# Patient Record
Sex: MALE | Race: WHITE | NOT HISPANIC OR LATINO | Employment: FULL TIME | URBAN - NONMETROPOLITAN AREA
[De-identification: names, ages, dates, MRNs, and addresses within clinical notes are randomized per-mention and may not be internally consistent; named-entity substitution may affect disease eponyms.]

---
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
## 2025-06-11 NOTE — ED PROVIDER NOTES
Subjective     History provided by:  Patient  Laceration  Location:  Hand  Hand laceration location:  L palm  Length:  3cm  Depth:  Through dermis  Bleeding: controlled    Time since incident:  2 hours  Laceration mechanism:  Knife  Pain details:     Quality:  Aching    Severity:  Mild  Foreign body present:  No foreign bodies  Tetanus status:  Up to date  Associated symptoms: no fever        Review of Systems   Constitutional: Negative.  Negative for fever.   HENT: Negative.    Respiratory: Negative.    Cardiovascular: Negative.  Negative for chest pain.   Gastrointestinal: Negative.  Negative for abdominal pain.   Endocrine: Negative.    Genitourinary: Negative.  Negative for dysuria.   Skin: Positive for wound.   Neurological: Negative.    Psychiatric/Behavioral: Negative.    All other systems reviewed and are negative.      No past medical history on file.    No Known Allergies    Past Surgical History:   Procedure Laterality Date   • ADENOIDECTOMY     • APPENDECTOMY     • TONSILLECTOMY         No family history on file.    Social History     Socioeconomic History   • Marital status: Single   Tobacco Use   • Smoking status: Never Smoker           Objective   Physical Exam  Vitals and nursing note reviewed.   Constitutional:       General: He is not in acute distress.     Appearance: He is well-developed. He is not diaphoretic.   HENT:      Head: Normocephalic and atraumatic.      Right Ear: External ear normal.      Left Ear: External ear normal.      Nose: Nose normal.   Eyes:      Conjunctiva/sclera: Conjunctivae normal.   Neck:      Vascular: No JVD.      Trachea: No tracheal deviation.   Cardiovascular:      Rate and Rhythm: Normal rate.      Heart sounds: No murmur heard.      Pulmonary:      Effort: Pulmonary effort is normal. No respiratory distress.      Breath sounds: No wheezing.   Abdominal:      Palpations: Abdomen is soft.      Tenderness: There is no abdominal tenderness.   Musculoskeletal:          Subjective   Patient ID: Chris Mariano is a 60 y.o. male who presents for Annual Exam.    Chris comes in today for comprehensive physical exam.  He is feeling well.  He is seeing an ENT for deviated septum and has had more frequent bloody noses, this is mild and manageable, very intermittent, they are considering surgical intervention for his deviated septum.  He is following closely with a urology specialist, actively watching a new diagnosis of prostate cancer.  He has some arthritis in his knees, again, manageable.  He denies any headaches, dizziness, chest pain or palpitations, shortness of breath nor cough, nausea, vomiting, nor changes in bowel nor bladder habits.  He follows with dermatology regularly.  He is amenable to updating comprehensive labs.         Review of Systems   Constitutional:  Negative for activity change, chills, fatigue and fever.   HENT:  Positive for nosebleeds (Occasional) and rhinorrhea. Negative for congestion, ear pain, postnasal drip, sinus pressure, sinus pain, sore throat, tinnitus and voice change.    Eyes:  Negative for discharge and visual disturbance.   Respiratory:  Negative for cough, chest tightness, shortness of breath and wheezing.    Cardiovascular:  Negative for chest pain, palpitations and leg swelling.   Gastrointestinal:  Negative for abdominal distention, abdominal pain, constipation, diarrhea, nausea and vomiting.   Endocrine: Negative for cold intolerance, heat intolerance and polyuria.   Genitourinary:  Negative for decreased urine volume, difficulty urinating, dysuria, frequency, hematuria and testicular pain.   Musculoskeletal:  Positive for arthralgias. Negative for back pain (Improved postoperatively), myalgias and neck stiffness.   Skin:  Negative for color change, pallor and rash.   Allergic/Immunologic: Positive for environmental allergies. Negative for food allergies and immunocompromised state.   Neurological:  Negative for dizziness, seizures, syncope,  "weakness, light-headedness and headaches.   Hematological:  Negative for adenopathy. Does not bruise/bleed easily.   Psychiatric/Behavioral:  Negative for behavioral problems, decreased concentration, dysphoric mood and sleep disturbance. The patient is not nervous/anxious and is not hyperactive.        Objective   /83   Pulse 68   Ht 1.753 m (5' 9.02\")   Wt 98 kg (216 lb)   SpO2 97%   BMI 31.88 kg/m²     Physical Exam  Vitals reviewed.   Constitutional:       General: He is not in acute distress.     Appearance: Normal appearance. He is not ill-appearing or diaphoretic.   HENT:      Head: Normocephalic and atraumatic.      Right Ear: Tympanic membrane, ear canal and external ear normal.      Left Ear: Tympanic membrane, ear canal and external ear normal.      Nose: Nose normal.      Comments: Deviated septum.  No active bleeding noted     Mouth/Throat:      Mouth: Mucous membranes are moist.      Pharynx: Oropharynx is clear. No oropharyngeal exudate.   Eyes:      General: No scleral icterus.     Conjunctiva/sclera: Conjunctivae normal.      Pupils: Pupils are equal, round, and reactive to light.   Neck:      Vascular: No carotid bruit.   Cardiovascular:      Rate and Rhythm: Normal rate and regular rhythm.      Pulses: Normal pulses.      Heart sounds: Normal heart sounds. No murmur heard.     No gallop.   Pulmonary:      Effort: Pulmonary effort is normal. No respiratory distress.      Breath sounds: Normal breath sounds. No wheezing, rhonchi or rales.   Abdominal:      General: There is no distension.      Palpations: Abdomen is soft. There is no mass.      Tenderness: There is no abdominal tenderness. There is no guarding or rebound.   Genitourinary:     Comments: Deferred to urology  Musculoskeletal:         General: No swelling or signs of injury. Normal range of motion.      Cervical back: Normal range of motion and neck supple. No tenderness.      Right lower leg: No edema.      Left lower leg: " General: No deformity. Normal range of motion.      Cervical back: Normal range of motion and neck supple.   Skin:     General: Skin is warm and dry.      Coloration: Skin is not pale.      Findings: No erythema or rash.      Comments: 3cm laceration of the left palm   Neurological:      Mental Status: He is alert and oriented to person, place, and time.      Cranial Nerves: No cranial nerve deficit.   Psychiatric:         Behavior: Behavior normal.         Thought Content: Thought content normal.         Laceration Repair    Date/Time: 10/13/2021 2:26 AM  Performed by: Morgan Villareal II, PA  Authorized by: Arturo Lynn DO     Consent:     Consent obtained:  Verbal    Consent given by:  Patient    Risks discussed:  Infection  Anesthesia (see MAR for exact dosages):     Anesthesia method:  Local infiltration    Local anesthetic:  Lidocaine 1% w/o epi  Laceration details:     Location:  Hand    Hand location:  L palm    Length (cm):  3  Repair type:     Repair type:  Simple  Pre-procedure details:     Preparation:  Patient was prepped and draped in usual sterile fashion  Exploration:     Hemostasis achieved with:  Direct pressure    Contaminated: no    Treatment:     Area cleansed with:  Betadine, Hibiclens and saline    Irrigation solution:  Sterile saline    Irrigation method:  Pressure wash    Visualized foreign bodies/material removed: no    Skin repair:     Repair method:  Sutures    Suture size:  3-0    Suture material:  Nylon    Suture technique:  Simple interrupted    Number of sutures:  3  Post-procedure details:     Dressing:  Non-adherent dressing    Patient tolerance of procedure:  Tolerated well, no immediate complications               ED Course                                           MDM  Number of Diagnoses or Management Options  Laceration of left palm, initial encounter: new and does not require workup  Risk of Complications, Morbidity, and/or Mortality  Presenting problems: low  Diagnostic  No edema.   Lymphadenopathy:      Cervical: No cervical adenopathy.   Skin:     General: Skin is warm.      Coloration: Skin is not jaundiced.      Findings: No erythema, lesion or rash.   Neurological:      General: No focal deficit present.      Mental Status: He is alert and oriented to person, place, and time. Mental status is at baseline.      Cranial Nerves: No cranial nerve deficit.      Motor: No weakness.      Coordination: Coordination normal.      Gait: Gait normal.   Psychiatric:         Mood and Affect: Mood normal.         Behavior: Behavior normal.         Thought Content: Thought content normal.         Judgment: Judgment normal.         Assessment/Plan     Full age-appropriate comprehensive physical exam and health care maintenance performed and discussed today.  Routine safety and preventative measures discussed including self testicular exam, seatbelt use, no drinking and driving, no texting and driving, abstinence or cessation of tobacco use, routine dental and vision exams, healthy diet and regular exercise.    We will update comprehensive labs and follow-up on results once available.  EKG as above.  Colonoscopy UTD 2023, repeat 5 years total.  Keeps up with routine vaccines, completed shingles and RSV vaccine recommendations.  Receives annual flu shots and updated COVID boosters.  Tetanus up-to-date from 2022.  Have discussed possible Prevnar.    Continue close follow-up with urology specialist.  He will try to decrease his gabapentin as he is not certain that this is needed anymore postoperatively.  He follows with his ENT specialist regularly as well.  He is to contact us with any questions.  Otherwise, happy to see him annually.   procedures: low  Management options: low    Patient Progress  Patient progress: stable      Final diagnoses:   Laceration of left palm, initial encounter       ED Disposition  ED Disposition     ED Disposition Condition Comment    Discharge Stable           PATIENT CONNECTION - [facility]  See Provider List  [facility]  [phone]  Schedule an appointment as soon as possible for a visit            Medication List      New Prescriptions    ibuprofen 800 MG tablet  Commonly known as: ADVIL,MOTRIN  Take 1 tablet by mouth Every 6 (Six) Hours As Needed for Moderate Pain .           Where to Get Your Medications      You can get these medications from any pharmacy    Bring a paper prescription for each of these medications  · ibuprofen 800 MG tablet          Morgan Villareal II, PA  10/13/21 0228